# Patient Record
(demographics unavailable — no encounter records)

---

## 2025-01-28 NOTE — CARDIOLOGY SUMMARY
[Today's Date] : [unfilled] [FreeTextEntry1] : NSR @52 bpm.  QTc = 465msec.  Otherwise, normal for age [de-identified] :  ICD EVALUATION (See separate report for full details): Joaquina presented in NSR while her Medtronic ICD was programmed in the VVI mode with a lower rate of 40 bpm. The battery longevity was estimated at 9 years. There were no events recorded. Her R waves were 12.8. She was not paced at all.  No changes made.

## 2025-01-28 NOTE — DISCUSSION/SUMMARY
[FreeTextEntry1] : In summary, Joaquina is doing very well with regard to her LQTS. No changes in her medications were made today. It is reassuring that on ICD interrogation there have been no events recorded. The ICD and lead measurements as documented above and changes as documented. Otherwise we will see her again in 6 months, when I will likely repeat a CXR. She will send a transmission in 3 months.    [Needs SBE Prophylaxis] : [unfilled] does not need bacterial endocarditis prophylaxis [PE + No Varsity Sports or Strenuous Activity] : [unfilled] may participate in the physical education program, WITH RESTRICTION from all varsity sports and from excessively stressful activities such as rope climbing, weight lifting, sustained running (i.e. laps) and fitness testing. Must be allowed to rest when tired.

## 2025-01-28 NOTE — HISTORY OF PRESENT ILLNESS
[FreeTextEntry1] : We had the pleasure of seeing Joaquina Paulson in our pediatric arrhythmia clinic at [facility] for her LQT type 1. Since her last office visit on 5/21/2024, she has been doing very well without any signs of malignant arrhythmias including no palpitations, syncope, seizures, cardiac arrest, or therapies from her ICD since her device change. She has been complaint with her medication of Nadolol 60 mg QD.

## 2025-01-28 NOTE — END OF VISIT
[FreeTextEntry3] : I, Dr. Cole, personally performed the evaluation and management (E/M) services for this established patient who presents today. That E/M includes conducting the examination, assessing all new/exacerbated conditions. reassessing pre-existing conditions, and establishing a new or updated plan of care. Today, the RN documented the Chief Complaint, source of information and performed med and allergy reconciliation with or without education.  The timing listed under billing is the time I personally spent reviewing material, evaluating the patient, discussing management issues, coordinating services, and making documentation.  [Time Spent: ___ minutes] : I have spent [unfilled] minutes of time on the encounter which excludes teaching and separately reported services.

## 2025-01-28 NOTE — CONSULT LETTER
[Today's Date] : [unfilled] [Dear  ___:] : Dear Dr. [unfilled]: [Consult - Single Provider] : Thank you very much for allowing me to participate in the care of this patient. If you have any questions, please do not hesitate to contact me. [Sincerely,] : Sincerely, [Name] : Name: [unfilled] [] : : ~~ [Today's Date:] : [unfilled] [FreeTextEntry4] : Dr Maki [FreeTextEntry5] : [address]  [FreeTextEntry6] : [facility] [id number] [de-identified] :    Mathew Cole MD, [facility] Associate Chief, Pediatric Cardiology , Pediatric Cardiac Electrophysiology [facility] Professor of Pediatrics [facility]

## 2025-07-01 NOTE — CONSULT LETTER
[Today's Date] : [unfilled] [Name] : Name: [unfilled] [] : : ~~ [Today's Date:] : [unfilled] [Dear  ___:] : Dear Dr. [unfilled]: [Consult - Single Provider] : Thank you very much for allowing me to participate in the care of this patient. If you have any questions, please do not hesitate to contact me. [Sincerely,] : Sincerely, [FreeTextEntry4] : Dr. John Maki MD [FreeTextEntry5] : [address] [facility] [address], [place], [place], [address] [de-identified] : Mathew Cole MD, [facility] Associate Chief, Pediatric Cardiology , Pediatric Cardiac Electrophysiology [facility] Professor of Pediatrics [facility]

## 2025-07-01 NOTE — CARDIOLOGY SUMMARY
[Today's Date] : [unfilled] [FreeTextEntry1] : NSR @61 bpm.  QTc = 448msec.  Otherwise, normal for age [de-identified] :  ICD EVALUATION (See separate report for full details): Joaquina presented in NSR while her Medtronic ICD was programmed in the VVI mode with a lower rate of 40 bpm. The battery longevity was estimated at 8.6 years. There were no events recorded. Her R waves were 12.6. She was not paced at all.  No changes made.

## 2025-07-01 NOTE — DISCUSSION/SUMMARY
[FreeTextEntry1] : In summary, Joaquina is doing very well with regard to her LQTS. No changes in her medications were made today. It is reassuring that on ICD interrogation there have been no events recorded. The ICD and lead measurements as documented above and changes as documented. Otherwise we will see her again in 4 months. She will send a transmission in 3 months.    [Needs SBE Prophylaxis] : [unfilled] does not need bacterial endocarditis prophylaxis [PE + No Varsity Sports or Strenuous Activity] : [unfilled] may participate in the physical education program, WITH RESTRICTION from all varsity sports and from excessively stressful activities such as rope climbing, weight lifting, sustained running (i.e. laps) and fitness testing. Must be allowed to rest when tired.

## 2025-07-01 NOTE — HISTORY OF PRESENT ILLNESS
[FreeTextEntry1] : We had the pleasure of seeing Joaquina Paulson in our pediatric arrhythmia clinic at [facility] for her LQT type 1. Since her last office visit on 1/28/2025, she has been doing very well without any signs of malignant arrhythmias including no palpitations, syncope, seizures, cardiac arrest, or therapies from her ICD since her device change. She has been complaint with her medication of Nadolol 60 mg QD.